# Patient Record
Sex: FEMALE | Race: WHITE | Employment: OTHER | URBAN - METROPOLITAN AREA
[De-identification: names, ages, dates, MRNs, and addresses within clinical notes are randomized per-mention and may not be internally consistent; named-entity substitution may affect disease eponyms.]

---

## 2017-01-17 ENCOUNTER — OFFICE VISIT (OUTPATIENT)
Dept: DERMATOLOGY | Age: 77
End: 2017-01-17

## 2017-01-17 DIAGNOSIS — L81.4 LENTIGO: ICD-10-CM

## 2017-01-17 DIAGNOSIS — L72.0 MILIAL CYST: Primary | ICD-10-CM

## 2017-01-17 PROCEDURE — 99212 OFFICE O/P EST SF 10 MIN: CPT | Performed by: DERMATOLOGY

## 2017-04-11 ENCOUNTER — OFFICE VISIT (OUTPATIENT)
Dept: DERMATOLOGY | Age: 77
End: 2017-04-11

## 2017-04-11 DIAGNOSIS — Z85.828 HISTORY OF NONMELANOMA SKIN CANCER: ICD-10-CM

## 2017-04-11 DIAGNOSIS — D48.9 NEOPLASM OF UNCERTAIN BEHAVIOR: Primary | ICD-10-CM

## 2017-04-11 DIAGNOSIS — D22.9 BENIGN NEVUS: ICD-10-CM

## 2017-04-11 DIAGNOSIS — L72.0 MILIAL CYST: ICD-10-CM

## 2017-04-11 DIAGNOSIS — L81.4 LENTIGO: ICD-10-CM

## 2017-04-11 PROCEDURE — 99214 OFFICE O/P EST MOD 30 MIN: CPT | Performed by: DERMATOLOGY

## 2017-04-11 PROCEDURE — 11100 PR BIOPSY OF SKIN LESION: CPT | Performed by: DERMATOLOGY

## 2017-04-17 ENCOUNTER — TELEPHONE (OUTPATIENT)
Dept: DERMATOLOGY | Age: 77
End: 2017-04-17

## 2017-10-17 ENCOUNTER — OFFICE VISIT (OUTPATIENT)
Dept: DERMATOLOGY | Age: 77
End: 2017-10-17

## 2017-10-17 DIAGNOSIS — D22.9 BENIGN NEVUS: ICD-10-CM

## 2017-10-17 DIAGNOSIS — Z85.828 HISTORY OF NONMELANOMA SKIN CANCER: ICD-10-CM

## 2017-10-17 DIAGNOSIS — L57.0 AK (ACTINIC KERATOSIS): Primary | ICD-10-CM

## 2017-10-17 DIAGNOSIS — L72.0 MILIAL CYST: ICD-10-CM

## 2017-10-17 DIAGNOSIS — D48.9 NEOPLASM OF UNCERTAIN BEHAVIOR: ICD-10-CM

## 2017-10-17 PROCEDURE — 99214 OFFICE O/P EST MOD 30 MIN: CPT | Performed by: DERMATOLOGY

## 2017-10-17 PROCEDURE — 17000 DESTRUCT PREMALG LESION: CPT | Performed by: DERMATOLOGY

## 2017-10-17 PROCEDURE — 11100 PR BIOPSY OF SKIN LESION: CPT | Performed by: DERMATOLOGY

## 2017-10-17 NOTE — PROGRESS NOTES
Social History Narrative    No narrative on file       Physical Examination       -General: Well-appearing, NAD  Normal affect. Total body skin exam including scalp, face, neck, chest, abdomen, back, bilateral upper extremities, bilateral lower extremities, ocular conjunctiva, external lips, and nails was performed. Underwear area not examined. Scattered on the trunk and extremities are multiple well-defined round and oval symmetric smoothly-bordered uniformly brown macules and papules. Gritty erythematous papule right nasal sidewall-actinic keratosis. Multiple white cystic papules on her face ranging in size from 1-3 mm face-milium  Left anterior scalp 4 mm pink papule rule out basal cell carcinoma-possible traumatized nevus          Assessment and Plan     1. AK (actinic keratosis) - 1, nose lesion(s) treated w/ liquid nitrogen. Edu re: risk of blister formation, discomfort, scar, hypopigmentation. Discussed wound care. 2. Neoplasm of uncertain behavior -Left anterior scalp--Discussed possible diagnosis. Patient agreeable to biopsy. Verbal consent obtained after risks (infection, bleeding, scar), benefits and alternatives explained. -Area(s) to be biopsied were marked with a surgical pen. Site(s) were cleansed with alcohol. Local anesthesia achieved with 1% lidocaine with epinephrine/sodium bicarbonate. Shave biopsy performed with a razor blade. Hemostasis was achieved with aluminum chloride. The wound(s) were dressed with petrolatum and covered with a bandage. Specimen(s) sent to pathology. Pt educated re: risk of bleeding, infection, scar and wound care instructions.      3. Benign nevus - Benign acquired melanocytic nevi  -Recommend monthly self skin exams   -Educated regarding the ABCDEs of melanoma detection   -Call for any new/changing moles or concerning lesions  -Reviewed sun protective behavior -- sun avoidance during the peak hours of the day, sun-protective clothing (including hat and

## 2017-10-23 ENCOUNTER — TELEPHONE (OUTPATIENT)
Dept: DERMATOLOGY | Age: 77
End: 2017-10-23

## 2018-03-16 ENCOUNTER — TELEPHONE (OUTPATIENT)
Dept: DERMATOLOGY | Age: 78
End: 2018-03-16

## 2018-03-19 ENCOUNTER — TELEPHONE (OUTPATIENT)
Dept: DERMATOLOGY | Age: 78
End: 2018-03-19

## 2018-03-19 DIAGNOSIS — L71.9 ROSACEA: ICD-10-CM

## 2018-03-19 RX ORDER — METRONIDAZOLE 7.5 MG/G
GEL TOPICAL
Qty: 60 G | Refills: 3 | Status: SHIPPED | OUTPATIENT
Start: 2018-03-19 | End: 2018-07-31 | Stop reason: SDUPTHER

## 2018-03-19 NOTE — TELEPHONE ENCOUNTER
Patient calling and would like metroNIDAZOLE (METROCREAM) 0.75 % cream,  She would like this in a gel.     Call back # [phone]

## 2018-03-22 ENCOUNTER — TELEPHONE (OUTPATIENT)
Dept: DERMATOLOGY | Age: 78
End: 2018-03-22

## 2018-04-17 ENCOUNTER — OFFICE VISIT (OUTPATIENT)
Dept: DERMATOLOGY | Age: 78
End: 2018-04-17

## 2018-04-17 DIAGNOSIS — Z85.828 HISTORY OF NONMELANOMA SKIN CANCER: ICD-10-CM

## 2018-04-17 DIAGNOSIS — L71.9 ROSACEA: Primary | ICD-10-CM

## 2018-04-17 DIAGNOSIS — D22.9 BENIGN NEVUS: ICD-10-CM

## 2018-04-17 DIAGNOSIS — L81.4 LENTIGO: ICD-10-CM

## 2018-04-17 DIAGNOSIS — L72.0 MILIAL CYST: ICD-10-CM

## 2018-04-17 PROCEDURE — G8400 PT W/DXA NO RESULTS DOC: HCPCS | Performed by: DERMATOLOGY

## 2018-04-17 PROCEDURE — 1123F ACP DISCUSS/DSCN MKR DOCD: CPT | Performed by: DERMATOLOGY

## 2018-04-17 PROCEDURE — G8421 BMI NOT CALCULATED: HCPCS | Performed by: DERMATOLOGY

## 2018-04-17 PROCEDURE — 1090F PRES/ABSN URINE INCON ASSESS: CPT | Performed by: DERMATOLOGY

## 2018-04-17 PROCEDURE — 4040F PNEUMOC VAC/ADMIN/RCVD: CPT | Performed by: DERMATOLOGY

## 2018-04-17 PROCEDURE — 1036F TOBACCO NON-USER: CPT | Performed by: DERMATOLOGY

## 2018-04-17 PROCEDURE — G8427 DOCREV CUR MEDS BY ELIG CLIN: HCPCS | Performed by: DERMATOLOGY

## 2018-04-17 PROCEDURE — 99214 OFFICE O/P EST MOD 30 MIN: CPT | Performed by: DERMATOLOGY

## 2018-07-09 ENCOUNTER — OFFICE VISIT (OUTPATIENT)
Dept: DERMATOLOGY | Age: 78
End: 2018-07-09

## 2018-07-09 DIAGNOSIS — Z79.01 ANTICOAGULANT LONG-TERM USE: ICD-10-CM

## 2018-07-09 DIAGNOSIS — L71.9 ROSACEA: Primary | ICD-10-CM

## 2018-07-09 PROCEDURE — 99213 OFFICE O/P EST LOW 20 MIN: CPT | Performed by: DERMATOLOGY

## 2018-07-09 PROCEDURE — 1123F ACP DISCUSS/DSCN MKR DOCD: CPT | Performed by: DERMATOLOGY

## 2018-07-09 PROCEDURE — 4040F PNEUMOC VAC/ADMIN/RCVD: CPT | Performed by: DERMATOLOGY

## 2018-07-09 PROCEDURE — 1036F TOBACCO NON-USER: CPT | Performed by: DERMATOLOGY

## 2018-07-09 PROCEDURE — G8421 BMI NOT CALCULATED: HCPCS | Performed by: DERMATOLOGY

## 2018-07-09 PROCEDURE — G8427 DOCREV CUR MEDS BY ELIG CLIN: HCPCS | Performed by: DERMATOLOGY

## 2018-07-09 PROCEDURE — 1090F PRES/ABSN URINE INCON ASSESS: CPT | Performed by: DERMATOLOGY

## 2018-07-09 PROCEDURE — G8400 PT W/DXA NO RESULTS DOC: HCPCS | Performed by: DERMATOLOGY

## 2018-07-09 RX ORDER — DOXYCYCLINE HYCLATE 100 MG
TABLET ORAL
Qty: 60 TABLET | Refills: 0 | Status: SHIPPED | OUTPATIENT
Start: 2018-07-09 | End: 2018-07-31 | Stop reason: SDUPTHER

## 2018-07-09 RX ORDER — MINOCYCLINE HYDROCHLORIDE 100 MG/1
TABLET ORAL
Qty: 60 TABLET | Refills: 1 | Status: CANCELLED | OUTPATIENT
Start: 2018-07-09

## 2018-07-09 NOTE — PROGRESS NOTES
[facility]) Dermatology  [facility] DO [address], [address]       Becka Cast  1940    69 y.o. female     Date of Visit: 2018    Chief Complaint:   Chief Complaint   Patient presents with    Dermatitis     face, started a few weeks ago with itching, started using a \"yeast\" natural supplement, she thought she had a skin yeast infection, pt said she did NOT use the Metrogel when this started and hasnt used it for a while as she was \"clear\"        I was asked to see this patient by Dr. Limon ref. provider found. History of Present Illness:  Becka Cast is a 69 y.o. female who presents with the chief complaint of the followin. Patient last saw Dr. Alli Mcdonnell on  for a flare of her rosacea. She was encouraged to apply the Metrogel 0.75% BID and change to Cetaphil soap. On exam at that visit it was noted that she had multiple active inflammatory papules to her nose and cheeks. Patient states about 2-3 weeks ago she noticed itching to her face diffusely, she self diagnosed as a yeast infection and took a natural \"yeast\" supplement. She has not been applying the MetroGel for at least 3 weeks as her face was \"clear. \" Denies history of oral antibiotic use for rosacea. Denies fevers or chills. Patient states PCP tested temperature which was normal.      Review of Systems:  Constitutional: Reports general sense of well-being   Skin: No new or changing moles, no history of keloids or hypertrophic scars, no interval of severe sunburns  Heme: +warfarin    Past Medical History, Family History, Surgical History, Medications and Allergies reviewed.     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

## 2018-07-11 LAB
GRAM STAIN RESULT: NORMAL
WOUND/ABSCESS: NORMAL

## 2018-07-12 ENCOUNTER — TELEPHONE (OUTPATIENT)
Dept: DERMATOLOGY | Age: 78
End: 2018-07-12

## 2018-07-12 NOTE — PROGRESS NOTES
Results negative from wound culture, please inform patient: She should continue current antibiotic treatment for rosacea flare, inform her PCP about checking INR more frequently and f/u with Dr. Laquita Johnson as scheduled.

## 2018-07-12 NOTE — TELEPHONE ENCOUNTER
----- Message from Bam Hoover DO sent at 7/12/2018 12:35 PM EDT -----  Results negative from wound culture, please inform patient: She should continue current antibiotic treatment for rosacea flare, inform her PCP about checking INR more frequently and f/u with Dr. Sydnee Mendoza as scheduled.

## 2018-07-12 NOTE — TELEPHONE ENCOUNTER
PCP already aware and executing  extra INR checking per the notes in CE , informed pt, she verbalized understanding

## 2018-07-24 ENCOUNTER — TELEPHONE (OUTPATIENT)
Dept: DERMATOLOGY | Age: 78
End: 2018-07-24

## 2018-07-31 ENCOUNTER — OFFICE VISIT (OUTPATIENT)
Dept: DERMATOLOGY | Age: 78
End: 2018-07-31

## 2018-07-31 DIAGNOSIS — Z79.01 ANTICOAGULANT LONG-TERM USE: ICD-10-CM

## 2018-07-31 DIAGNOSIS — L71.9 ROSACEA: Primary | ICD-10-CM

## 2018-07-31 DIAGNOSIS — L24.9 IRRITANT DERMATITIS: ICD-10-CM

## 2018-07-31 PROCEDURE — 1090F PRES/ABSN URINE INCON ASSESS: CPT | Performed by: DERMATOLOGY

## 2018-07-31 PROCEDURE — G8421 BMI NOT CALCULATED: HCPCS | Performed by: DERMATOLOGY

## 2018-07-31 PROCEDURE — G8400 PT W/DXA NO RESULTS DOC: HCPCS | Performed by: DERMATOLOGY

## 2018-07-31 PROCEDURE — 1036F TOBACCO NON-USER: CPT | Performed by: DERMATOLOGY

## 2018-07-31 PROCEDURE — 1101F PT FALLS ASSESS-DOCD LE1/YR: CPT | Performed by: DERMATOLOGY

## 2018-07-31 PROCEDURE — 99213 OFFICE O/P EST LOW 20 MIN: CPT | Performed by: DERMATOLOGY

## 2018-07-31 PROCEDURE — 4040F PNEUMOC VAC/ADMIN/RCVD: CPT | Performed by: DERMATOLOGY

## 2018-07-31 PROCEDURE — 1123F ACP DISCUSS/DSCN MKR DOCD: CPT | Performed by: DERMATOLOGY

## 2018-07-31 PROCEDURE — G8427 DOCREV CUR MEDS BY ELIG CLIN: HCPCS | Performed by: DERMATOLOGY

## 2018-07-31 RX ORDER — METRONIDAZOLE 7.5 MG/G
GEL TOPICAL
Qty: 180 G | Refills: 3 | Status: SHIPPED | OUTPATIENT
Start: 2018-07-31 | End: 2021-04-27 | Stop reason: SDUPTHER

## 2018-07-31 RX ORDER — DOXYCYCLINE HYCLATE 100 MG
TABLET ORAL
Qty: 60 TABLET | Refills: 1 | Status: SHIPPED | OUTPATIENT
Start: 2018-07-31 | End: 2018-09-11 | Stop reason: SDUPTHER

## 2018-07-31 RX ORDER — OMEPRAZOLE 20 MG/1
CAPSULE, DELAYED RELEASE ORAL
COMMUNITY
Start: 2018-07-23

## 2018-07-31 RX ORDER — TRIAMCINOLONE ACETONIDE 1 MG/G
CREAM TOPICAL
Qty: 80 G | Refills: 2 | Status: SHIPPED | OUTPATIENT
Start: 2018-07-31 | End: 2020-07-21 | Stop reason: ALTCHOICE

## 2018-07-31 NOTE — PROGRESS NOTES
[facility]) Dermatology  Kim Cavazos M.D.  [phone]       Vernon Herrera  1940    69 y.o. female     Date of Visit: 7/31/2018    Chief Complaint:   Chief Complaint   Patient presents with    Follow-up    Rosacea     pt states there is much improvement        I was asked to see this patient by Dr. Limon ref. provider found. History of Present Illness:  1. Patient presents today for follow-up of rosacea flare. Much improved. Using MetroGel 0.75% b.i.d. and on doxycycline 100 mg p.o. b.i.d..  Inflammatory papules. Still has scale/irritation and erythema. Mild nausea on doxycycline but otherwise tolerating this well. Has had Coumadin check and adjusted dosing as needed. Mild pruritus of her back. Has developed in the last 1-2 weeks. Notes xerosis. 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 Social History:   Social History     Social History    Marital status:      Spouse name: N/A    Number of children: N/A    Years of education: N/A     Occupational History    Not on file.      Social History Main Topics    Smoking status: Never Smoker    Smokeless tobacco: Never Used    Alcohol use No    Drug use: Unknown    Sexual activity: Not on file     Other

## 2018-09-11 ENCOUNTER — OFFICE VISIT (OUTPATIENT)
Dept: DERMATOLOGY | Age: 78
End: 2018-09-11

## 2018-09-11 DIAGNOSIS — L71.9 ROSACEA: ICD-10-CM

## 2018-09-11 PROCEDURE — 1101F PT FALLS ASSESS-DOCD LE1/YR: CPT | Performed by: DERMATOLOGY

## 2018-09-11 PROCEDURE — G8427 DOCREV CUR MEDS BY ELIG CLIN: HCPCS | Performed by: DERMATOLOGY

## 2018-09-11 PROCEDURE — 99213 OFFICE O/P EST LOW 20 MIN: CPT | Performed by: DERMATOLOGY

## 2018-09-11 PROCEDURE — G8421 BMI NOT CALCULATED: HCPCS | Performed by: DERMATOLOGY

## 2018-09-11 PROCEDURE — 1123F ACP DISCUSS/DSCN MKR DOCD: CPT | Performed by: DERMATOLOGY

## 2018-09-11 PROCEDURE — 4040F PNEUMOC VAC/ADMIN/RCVD: CPT | Performed by: DERMATOLOGY

## 2018-09-11 PROCEDURE — 1090F PRES/ABSN URINE INCON ASSESS: CPT | Performed by: DERMATOLOGY

## 2018-09-11 PROCEDURE — G8400 PT W/DXA NO RESULTS DOC: HCPCS | Performed by: DERMATOLOGY

## 2018-09-11 PROCEDURE — 1036F TOBACCO NON-USER: CPT | Performed by: DERMATOLOGY

## 2018-09-11 RX ORDER — DOXYCYCLINE HYCLATE 100 MG
TABLET ORAL
Qty: 180 TABLET | Refills: 0 | Status: SHIPPED | OUTPATIENT
Start: 2018-09-11 | End: 2018-10-30 | Stop reason: SDUPTHER

## 2018-09-11 RX ORDER — SIMVASTATIN 10 MG
TABLET ORAL
COMMUNITY
Start: 2018-08-02

## 2018-10-30 ENCOUNTER — OFFICE VISIT (OUTPATIENT)
Dept: DERMATOLOGY | Age: 78
End: 2018-10-30
Payer: MEDICARE

## 2018-10-30 DIAGNOSIS — D22.9 BENIGN NEVUS: ICD-10-CM

## 2018-10-30 DIAGNOSIS — L71.9 ROSACEA: Primary | ICD-10-CM

## 2018-10-30 DIAGNOSIS — Z85.828 HISTORY OF NONMELANOMA SKIN CANCER: ICD-10-CM

## 2018-10-30 DIAGNOSIS — D48.5 NEOPLASM OF UNCERTAIN BEHAVIOR OF SKIN: ICD-10-CM

## 2018-10-30 PROCEDURE — 1101F PT FALLS ASSESS-DOCD LE1/YR: CPT | Performed by: DERMATOLOGY

## 2018-10-30 PROCEDURE — G8484 FLU IMMUNIZE NO ADMIN: HCPCS | Performed by: DERMATOLOGY

## 2018-10-30 PROCEDURE — 11100 PR BIOPSY OF SKIN LESION: CPT | Performed by: DERMATOLOGY

## 2018-10-30 PROCEDURE — G8421 BMI NOT CALCULATED: HCPCS | Performed by: DERMATOLOGY

## 2018-10-30 PROCEDURE — 1123F ACP DISCUSS/DSCN MKR DOCD: CPT | Performed by: DERMATOLOGY

## 2018-10-30 PROCEDURE — 4040F PNEUMOC VAC/ADMIN/RCVD: CPT | Performed by: DERMATOLOGY

## 2018-10-30 PROCEDURE — 1036F TOBACCO NON-USER: CPT | Performed by: DERMATOLOGY

## 2018-10-30 PROCEDURE — 1090F PRES/ABSN URINE INCON ASSESS: CPT | Performed by: DERMATOLOGY

## 2018-10-30 PROCEDURE — G8400 PT W/DXA NO RESULTS DOC: HCPCS | Performed by: DERMATOLOGY

## 2018-10-30 PROCEDURE — 99213 OFFICE O/P EST LOW 20 MIN: CPT | Performed by: DERMATOLOGY

## 2018-10-30 PROCEDURE — G8427 DOCREV CUR MEDS BY ELIG CLIN: HCPCS | Performed by: DERMATOLOGY

## 2018-10-30 RX ORDER — DOXYCYCLINE HYCLATE 100 MG
TABLET ORAL
Qty: 180 TABLET | Refills: 1 | Status: SHIPPED | OUTPATIENT
Start: 2018-10-30 | End: 2020-07-21 | Stop reason: ALTCHOICE

## 2018-10-30 NOTE — PROGRESS NOTES
[facility]) Dermatology  Park Galindo M.D.  [phone]       Theodore Means  1940    69 y.o. female     Date of Visit: 10/30/2018    Chief Complaint:   Chief Complaint   Patient presents with    Skin Lesion        I was asked to see this patient by Dr. Ashly whiting. provider found. History of Present Illness:  1. Total body skin exam    Rosacea-on doxycycline 100 mg p.o. q. day, which she has tolerated without a flare. Still using MetroGel. Occasional nausea with doxycycline. Has not noticed any new or changing pigmented lesions. 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 Social History:   Social History     Social History    Marital status:      Spouse name: N/A    Number of children: N/A    Years of education: N/A     Occupational History    Not on file. Social History Main Topics    Smoking status: Never Smoker    Smokeless tobacco: Never Used    Alcohol use No    Drug use: Unknown    Sexual activity: Not on file     Other Topics Concern    Not on file     Social History Narrative    No narrative on file       Physical Examination       -General: Well-appearing, NAD  1. Normal affect.   Total body skin exam including

## 2018-11-06 LAB — DERMATOLOGY PATHOLOGY REPORT: ABNORMAL

## 2018-11-07 ENCOUNTER — TELEPHONE (OUTPATIENT)
Dept: DERMATOLOGY | Age: 78
End: 2018-11-07

## 2018-11-07 DIAGNOSIS — D03.39 MELANOMA IN SITU OF CHEEK (HCC): Primary | ICD-10-CM

## 2018-11-07 NOTE — TELEPHONE ENCOUNTER
Referral faxed to Dr. Rosmery Ontiveros office.  Office note and path results sent to Dr. Kike Rivero

## 2018-11-07 NOTE — TELEPHONE ENCOUNTER
----- Message from Prashant Ramirez MD sent at 11/7/2018  1:54 PM EST -----  Please refer to Mauricio Lloyd for definitive wide local excision-please also send a copy of pathology and office note to her primary care doctor. Pls schedule follow up with me 3 mo.

## 2019-01-22 ENCOUNTER — OFFICE VISIT (OUTPATIENT)
Dept: DERMATOLOGY | Age: 79
End: 2019-01-22
Payer: MEDICARE

## 2019-01-22 DIAGNOSIS — Z85.828 HISTORY OF NONMELANOMA SKIN CANCER: ICD-10-CM

## 2019-01-22 DIAGNOSIS — Z85.820 HISTORY OF MELANOMA: ICD-10-CM

## 2019-01-22 DIAGNOSIS — L71.9 ROSACEA: Primary | ICD-10-CM

## 2019-01-22 DIAGNOSIS — D22.9 BENIGN NEVUS: ICD-10-CM

## 2019-01-22 DIAGNOSIS — L57.8 DIFFUSE PHOTODAMAGE OF SKIN: ICD-10-CM

## 2019-01-22 PROCEDURE — 1090F PRES/ABSN URINE INCON ASSESS: CPT | Performed by: DERMATOLOGY

## 2019-01-22 PROCEDURE — 99214 OFFICE O/P EST MOD 30 MIN: CPT | Performed by: DERMATOLOGY

## 2019-01-22 PROCEDURE — 1101F PT FALLS ASSESS-DOCD LE1/YR: CPT | Performed by: DERMATOLOGY

## 2019-01-22 PROCEDURE — G8421 BMI NOT CALCULATED: HCPCS | Performed by: DERMATOLOGY

## 2019-01-22 PROCEDURE — 1036F TOBACCO NON-USER: CPT | Performed by: DERMATOLOGY

## 2019-01-22 PROCEDURE — G8484 FLU IMMUNIZE NO ADMIN: HCPCS | Performed by: DERMATOLOGY

## 2019-01-22 PROCEDURE — 4040F PNEUMOC VAC/ADMIN/RCVD: CPT | Performed by: DERMATOLOGY

## 2019-01-22 PROCEDURE — G8400 PT W/DXA NO RESULTS DOC: HCPCS | Performed by: DERMATOLOGY

## 2019-01-22 PROCEDURE — G8427 DOCREV CUR MEDS BY ELIG CLIN: HCPCS | Performed by: DERMATOLOGY

## 2019-01-22 PROCEDURE — 1123F ACP DISCUSS/DSCN MKR DOCD: CPT | Performed by: DERMATOLOGY

## 2019-05-28 ENCOUNTER — OFFICE VISIT (OUTPATIENT)
Dept: DERMATOLOGY | Age: 79
End: 2019-05-28
Payer: MEDICARE

## 2019-05-28 DIAGNOSIS — L71.9 ROSACEA: Primary | ICD-10-CM

## 2019-05-28 DIAGNOSIS — D22.9 BENIGN NEVUS: ICD-10-CM

## 2019-05-28 DIAGNOSIS — Z85.820 HISTORY OF MELANOMA: ICD-10-CM

## 2019-05-28 DIAGNOSIS — Z85.828 HISTORY OF NONMELANOMA SKIN CANCER: ICD-10-CM

## 2019-05-28 PROCEDURE — 1090F PRES/ABSN URINE INCON ASSESS: CPT | Performed by: DERMATOLOGY

## 2019-05-28 PROCEDURE — 1123F ACP DISCUSS/DSCN MKR DOCD: CPT | Performed by: DERMATOLOGY

## 2019-05-28 PROCEDURE — G8427 DOCREV CUR MEDS BY ELIG CLIN: HCPCS | Performed by: DERMATOLOGY

## 2019-05-28 PROCEDURE — 99213 OFFICE O/P EST LOW 20 MIN: CPT | Performed by: DERMATOLOGY

## 2019-05-28 PROCEDURE — G8400 PT W/DXA NO RESULTS DOC: HCPCS | Performed by: DERMATOLOGY

## 2019-05-28 PROCEDURE — 1036F TOBACCO NON-USER: CPT | Performed by: DERMATOLOGY

## 2019-05-28 PROCEDURE — G8421 BMI NOT CALCULATED: HCPCS | Performed by: DERMATOLOGY

## 2019-05-28 PROCEDURE — 4040F PNEUMOC VAC/ADMIN/RCVD: CPT | Performed by: DERMATOLOGY

## 2019-05-28 NOTE — PROGRESS NOTES
[address]  Dermatology  Mily Montes M.D.  [phone]       Ashley Orourke  1940    69 y.o. female     Date of Visit: 5/28/2019    Chief Complaint:   Chief Complaint   Patient presents with    Skin Lesion     TBSE        I was asked to see this patient by Dr. Limon ref. provider found. History of Present Illness:  1. Total body skin exam    Rosacea-no recent flares. Remains on metro gel 0.75% b.i.d. Has not had to take doxycycline since her last visit. Multiple nevi. Stable in size, shape, color. Has not noticed any new or changing pigmented lesions. Does monitor her skin for change. 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     Social History:   Social History     Socioeconomic History    Marital status:      Spouse name: Not on file    Number of children: Not on file    Years of education: Not on file    Highest education level: Not on file   Occupational History    Not on file   Social Needs    Financial resource strain: Not on file    Food insecurity:     Worry: Not on file Inability: Not on file    Transportation needs:     Medical: Not on file     Non-medical: Not on file   Tobacco Use    Smoking status: Never Smoker    Smokeless tobacco: Never Used   Substance and Sexual Activity    Alcohol use: No    Drug use: Not on file    Sexual activity: Not on file   Lifestyle    Physical activity:     Days per week: Not on file     Minutes per session: Not on file    Stress: Not on file   Relationships    Social connections:     Talks on phone: Not on file     Gets together: Not on file     Attends Orthodoxy service: Not on file     Active member of club or organization: Not on file     Attends meetings of clubs or organizations: Not on file     Relationship status: Not on file    Intimate partner violence:     Fear of current or ex partner: Not on file     Emotionally abused: Not on file     Physically abused: Not on file     Forced sexual activity: Not on file   Other Topics Concern    Not on file   Social History Narrative    Not on file       Physical Examination       -General: Well-appearing, NAD  1. Normal affect. Total body skin exam including scalp, face, neck, chest, abdomen, back, bilateral upper extremities, bilateral lower extremities, ocular conjunctiva, external lips, and nails was performed. Examination normal unless stated below. Underwear area not examined. Scattered on the trunk and extremities are multiple well-defined round and oval symmetric smoothly-bordered uniformly brown macules and papules. Background erythema of face with no active inflammatory papules. Well-healed scar at site of prior melanoma and nonmelanoma skin cancers no sign of recurrence      Assessment and Plan     1. Rosacea -MetroGel 0.75% b.i.d. Doxycycline 100 mg p.o. b.i.d. for 2 weeks during flares    2.  Benign nevus - Benign acquired melanocytic nevi  -Recommend monthly self skin exams   -Educated regarding the ABCDEs of melanoma detection   -Call for any new/changing moles or

## 2019-09-24 ENCOUNTER — OFFICE VISIT (OUTPATIENT)
Dept: DERMATOLOGY | Age: 79
End: 2019-09-24
Payer: MEDICARE

## 2019-09-24 DIAGNOSIS — Z85.828 HISTORY OF NONMELANOMA SKIN CANCER: ICD-10-CM

## 2019-09-24 DIAGNOSIS — D22.9 BENIGN NEVUS: ICD-10-CM

## 2019-09-24 DIAGNOSIS — D48.5 NEOPLASM OF UNCERTAIN BEHAVIOR OF SKIN: Primary | ICD-10-CM

## 2019-09-24 DIAGNOSIS — L71.9 ROSACEA: ICD-10-CM

## 2019-09-24 DIAGNOSIS — Z85.820 HISTORY OF MELANOMA: ICD-10-CM

## 2019-09-24 PROCEDURE — G8421 BMI NOT CALCULATED: HCPCS | Performed by: DERMATOLOGY

## 2019-09-24 PROCEDURE — G8427 DOCREV CUR MEDS BY ELIG CLIN: HCPCS | Performed by: DERMATOLOGY

## 2019-09-24 PROCEDURE — 4040F PNEUMOC VAC/ADMIN/RCVD: CPT | Performed by: DERMATOLOGY

## 2019-09-24 PROCEDURE — 1090F PRES/ABSN URINE INCON ASSESS: CPT | Performed by: DERMATOLOGY

## 2019-09-24 PROCEDURE — 11102 TANGNTL BX SKIN SINGLE LES: CPT | Performed by: DERMATOLOGY

## 2019-09-24 PROCEDURE — 1036F TOBACCO NON-USER: CPT | Performed by: DERMATOLOGY

## 2019-09-24 PROCEDURE — G8400 PT W/DXA NO RESULTS DOC: HCPCS | Performed by: DERMATOLOGY

## 2019-09-24 PROCEDURE — 99214 OFFICE O/P EST MOD 30 MIN: CPT | Performed by: DERMATOLOGY

## 2019-09-24 PROCEDURE — 1123F ACP DISCUSS/DSCN MKR DOCD: CPT | Performed by: DERMATOLOGY

## 2019-09-27 LAB — DERMATOLOGY PATHOLOGY REPORT: NORMAL

## 2019-12-17 ENCOUNTER — TELEPHONE (OUTPATIENT)
Dept: DERMATOLOGY | Age: 79
End: 2019-12-17

## 2020-07-21 ENCOUNTER — OFFICE VISIT (OUTPATIENT)
Dept: DERMATOLOGY | Age: 80
End: 2020-07-21
Payer: MEDICARE

## 2020-07-21 VITALS — TEMPERATURE: 98.4 F

## 2020-07-21 PROCEDURE — G8421 BMI NOT CALCULATED: HCPCS | Performed by: DERMATOLOGY

## 2020-07-21 PROCEDURE — G8427 DOCREV CUR MEDS BY ELIG CLIN: HCPCS | Performed by: DERMATOLOGY

## 2020-07-21 PROCEDURE — 1090F PRES/ABSN URINE INCON ASSESS: CPT | Performed by: DERMATOLOGY

## 2020-07-21 PROCEDURE — 1036F TOBACCO NON-USER: CPT | Performed by: DERMATOLOGY

## 2020-07-21 PROCEDURE — 99214 OFFICE O/P EST MOD 30 MIN: CPT | Performed by: DERMATOLOGY

## 2020-07-21 PROCEDURE — 4040F PNEUMOC VAC/ADMIN/RCVD: CPT | Performed by: DERMATOLOGY

## 2020-07-21 PROCEDURE — G8400 PT W/DXA NO RESULTS DOC: HCPCS | Performed by: DERMATOLOGY

## 2020-07-21 PROCEDURE — 1123F ACP DISCUSS/DSCN MKR DOCD: CPT | Performed by: DERMATOLOGY

## 2020-07-21 NOTE — PROGRESS NOTES
[facility]) Dermatology  Sharda Almazan M.D.  [phone]       Brittanie Edmond  1940    67 y.o. female     Date of Visit: 7/21/2020    Chief Complaint:   Chief Complaint   Patient presents with    Skin Exam     TBSE, Hx of melanoma, NMSC,AK's        I was asked to see this patient by Dr. Limon ref. provider found. History of Present Illness:  1. Total-body skin exam    Multiple nevi-stable in size, shape, color. Has not noticed any new or changing pigmented lesions    Widespread milium face-developed after a facelift. Stable. Not itching, bleeding. Rosacea-currently using MetroGel 0.75% twice daily. Does have a prescription for doxycycline for 100 mg p.o. twice daily for 2 weeks as needed for flares. Has not needed this since her last appointment. 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     Social History:   Social History     Socioeconomic History    Marital status:      Spouse name: Not on file    Number of children: Not on file    Years of education: Not on file    Highest education level: Not on file   Occupational History    Not on file   Social Needs    Financial resource strain: Not on file    Food insecurity     Worry: Not on file     Inability: Not on file    Transportation needs     Medical: Not on file     Non-medical: Not on file   Tobacco Use    Smoking status: Never Smoker    Smokeless tobacco: Never Used   Substance and Sexual Activity    Alcohol use: No    Drug use: Not on file    Sexual activity: Not on file   Lifestyle    Physical activity     Days per week: Not on file     Minutes per session: Not on file    Stress: Not on file   Relationships    Social connections     Talks on phone: Not on file     Gets together: Not on file     Attends [employer] service: Not on file     Active member of club or organization: Not on file     Attends meetings of clubs or organizations: Not on file     Relationship status: Not on file    Intimate partner violence     Fear of current or ex partner: Not on file     Emotionally abused: Not on file     Physically abused: Not on file     Forced sexual activity: Not on file   Other Topics Concern    Not on file   Social History Narrative    Not on file       Physical Examination       -General: Well-appearing, NAD  1. Normal affect. Total body skin exam including scalp, face, neck, chest, abdomen, back, bilateral upper extremities, bilateral lower extremities, ocular conjunctiva, external lips, and nails was performed. Examination normal unless stated below. Underwear area not examined. Scattered on the trunk and extremities are multiple well-defined round and oval symmetric smoothly-bordered uniformly brown macules and papules. Multiple pink and white cystic papules face  Background erythema with mild erythema of nose and paranasal cheeks.   No active inflammatory papules        Assessment and Plan     1. Benign nevus - Benign acquired melanocytic nevi  -Recommend monthly self skin exams   -Educated regarding the ABCDEs of melanoma detection   -Call for any new/changing moles or concerning lesions  -Reviewed sun protective behavior -- sun avoidance during the peak hours of the day, sun-protective clothing (including hat and sunglasses), sunscreen use (water resistant, broad spectrum, SPF at least 30, need for reapplication every 2 to 3 hours), avoidance of tanning beds      2. Rosacea-MetroGel 0.75% twice daily, doxycycline 100 mg p.o. twice daily for up to 2 weeks for severe flares-Coumadin checks of start doxycycline. 3. Milium-monitor for change, no treatment for now, differential diagnosis includes osteoma cutis   4. History of nonmelanoma skin cancer - No evidence of recurrence. Discussed risk of subsequent skin cancers and increased risk of melanoma. Reviewed importance of monitoring skin for change and sun protection with hats and sunscreen, as well as sun avoidance. 5. History of melanoma - No evidence of recurrence. Discussed risk of subsequent skin cancers and increased risk of melanoma. Reviewed importance of monitoring skin for change and sun protection with hats and sunscreen, as well as sun avoidance.

## 2020-11-23 ENCOUNTER — TELEPHONE (OUTPATIENT)
Dept: DERMATOLOGY | Age: 80
End: 2020-11-23

## 2020-11-23 NOTE — TELEPHONE ENCOUNTER
Patient called- she has a mole located on her back that she is very concerned about, has a history of melanoma. Pt would like to come in sometime this week if possible.      [phone]

## 2020-11-24 ENCOUNTER — OFFICE VISIT (OUTPATIENT)
Dept: DERMATOLOGY | Age: 80
End: 2020-11-24
Payer: MEDICARE

## 2020-11-24 VITALS — TEMPERATURE: 96 F

## 2020-11-24 PROCEDURE — 99213 OFFICE O/P EST LOW 20 MIN: CPT | Performed by: DERMATOLOGY

## 2020-11-24 PROCEDURE — 1090F PRES/ABSN URINE INCON ASSESS: CPT | Performed by: DERMATOLOGY

## 2020-11-24 PROCEDURE — 1036F TOBACCO NON-USER: CPT | Performed by: DERMATOLOGY

## 2020-11-24 PROCEDURE — G8484 FLU IMMUNIZE NO ADMIN: HCPCS | Performed by: DERMATOLOGY

## 2020-11-24 PROCEDURE — 11102 TANGNTL BX SKIN SINGLE LES: CPT | Performed by: DERMATOLOGY

## 2020-11-24 PROCEDURE — 1123F ACP DISCUSS/DSCN MKR DOCD: CPT | Performed by: DERMATOLOGY

## 2020-11-24 PROCEDURE — G8421 BMI NOT CALCULATED: HCPCS | Performed by: DERMATOLOGY

## 2020-11-24 PROCEDURE — 4040F PNEUMOC VAC/ADMIN/RCVD: CPT | Performed by: DERMATOLOGY

## 2020-11-24 PROCEDURE — G8400 PT W/DXA NO RESULTS DOC: HCPCS | Performed by: DERMATOLOGY

## 2020-11-24 PROCEDURE — G8427 DOCREV CUR MEDS BY ELIG CLIN: HCPCS | Performed by: DERMATOLOGY

## 2020-11-24 NOTE — PROGRESS NOTES
[facility]) Dermatology  Beverly Weeks M.D.  [phone]       Jai Lang  1940    79 y.o. female     Date of Visit: 11/24/2020    Chief Complaint:   Chief Complaint   Patient presents with    Skin Lesion     back        I was asked to see this patient by Dr. Limon ref. provider found. History of Present Illness:  1. Urgent visit     Patient squeezed lesion left upper paraspinal back 5 days ago-became inflamed. The next day she scratched it again-became crusted.  has been applying a topical antibiotic. Patient very concerned it could be skin cancer. 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     Social History:   Social History     Socioeconomic History    Marital status:      Spouse name: Not on file    Number of children: Not on file    Years of education: Not on file    Highest education level: Not on file   Occupational History    Not on file   Social Needs    Financial resource strain: Not on file    Food insecurity     Worry: Not on file     Inability: Not on file    Transportation needs     Medical: Not on file     Non-medical: Not on file   Tobacco Use    Smoking status: Never Smoker    Smokeless tobacco: Never Used   Substance and Sexual Activity    Alcohol use: No    Drug use: Not on file    Sexual activity: Not on file   Lifestyle    Physical activity     Days per week: Not on file     Minutes per session: Not on file    Stress: Not on file   Relationships    Social connections     Talks on phone: Not on file     Gets together: Not on file     Attends Jew service: Not on file     Active member of club or organization: Not on file     Attends meetings of clubs or organizations: Not on file     Relationship status: Not on file    Intimate partner violence     Fear of current or ex partner: Not on file     Emotionally abused: Not on file     Physically abused: Not on file     Forced sexual activity: Not on file   Other Topics Concern    Not on file   Social History Narrative    Not on file       Physical Examination       -General: Well-appearing, NAD  1.  6 mm crusted papule left upper paraspinal back-possible inflamed seborrheic keratosis  Multiple other hyperkeratotic stuck on papules on her back-not inflamed          Assessment and Plan     1. Neoplasm of uncertain behavior of skin -left upper paraspinal back-possible inflamed seborrheic keratosis/ cyst-Discussed possible diagnosis. Patient agreeable to biopsy. Verbal consent obtained after risks (infection, bleeding, scar), benefits and alternatives explained. -Area(s) to be biopsied were marked with a surgical pen. Site(s) were cleansed with alcohol. Local anesthesia achieved with 1% lidocaine with epinephrine/sodium bicarbonate. Shave biopsy performed with a razor blade. Hemostasis was achieved with aluminum chloride. The wound(s) were dressed with petrolatum and covered with a bandage. Specimen(s) sent to pathology. Pt educated re: risk of bleeding, infection, scar and wound care instructions. After lesion anesthetized with 1% lidocaine, superficial crust removed-clear punctum and cyst contents visible. Cyst extracted through punctum and sent to pathology with crust.  Suspect inflamed cyst manipulated by patient. 2. Seborrheic keratoses--noninflamed. Monitor for change.   Treatment if they become symptomatic

## 2020-12-01 LAB — DERMATOLOGY PATHOLOGY REPORT: NORMAL

## 2021-01-26 ENCOUNTER — OFFICE VISIT (OUTPATIENT)
Dept: DERMATOLOGY | Age: 81
End: 2021-01-26
Payer: MEDICARE

## 2021-01-26 VITALS — TEMPERATURE: 98.1 F

## 2021-01-26 DIAGNOSIS — Z85.828 HISTORY OF NONMELANOMA SKIN CANCER: ICD-10-CM

## 2021-01-26 DIAGNOSIS — L82.1 SEBORRHEIC KERATOSES: ICD-10-CM

## 2021-01-26 DIAGNOSIS — Z85.820 HISTORY OF MELANOMA: ICD-10-CM

## 2021-01-26 DIAGNOSIS — D22.9 BENIGN NEVUS: Primary | ICD-10-CM

## 2021-01-26 PROCEDURE — 99213 OFFICE O/P EST LOW 20 MIN: CPT | Performed by: DERMATOLOGY

## 2021-01-26 PROCEDURE — 1090F PRES/ABSN URINE INCON ASSESS: CPT | Performed by: DERMATOLOGY

## 2021-01-26 PROCEDURE — 1123F ACP DISCUSS/DSCN MKR DOCD: CPT | Performed by: DERMATOLOGY

## 2021-01-26 PROCEDURE — 4040F PNEUMOC VAC/ADMIN/RCVD: CPT | Performed by: DERMATOLOGY

## 2021-01-26 PROCEDURE — G8427 DOCREV CUR MEDS BY ELIG CLIN: HCPCS | Performed by: DERMATOLOGY

## 2021-01-26 PROCEDURE — G8400 PT W/DXA NO RESULTS DOC: HCPCS | Performed by: DERMATOLOGY

## 2021-01-26 PROCEDURE — 1036F TOBACCO NON-USER: CPT | Performed by: DERMATOLOGY

## 2021-01-26 PROCEDURE — G8484 FLU IMMUNIZE NO ADMIN: HCPCS | Performed by: DERMATOLOGY

## 2021-01-26 PROCEDURE — G8421 BMI NOT CALCULATED: HCPCS | Performed by: DERMATOLOGY

## 2021-01-26 NOTE — PROGRESS NOTES
[facility]) Dermatology  Alexx Jamison M.D.  [phone]       Nancy Call  1940    79 y.o. female     Date of Visit: 1/26/2021    Chief Complaint:   Chief Complaint   Patient presents with    Skin Lesion     TBSE        I was asked to see this patient by Dr. Limon ref. provider found. History of Present Illness:  1. Total-body skin exam    Multiple nevi-stable in size, shape, color. Has not noticed any new or changing pigmented lesions. Does monitor her skin for change    Increasing number of seborrheic keratoses-asymptomatic. Not itching, bleeding.     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     Social History:   Social History     Socioeconomic History    Marital status:      Spouse name: Not on file    Number of children: Not on file    Years of education: Not on file    Highest education level: Not on file   Occupational History    Not on file   Social Needs    Financial resource strain: Not on file    Food insecurity     Worry: Not on file     Inability: Not on file    Transportation needs     Medical: Not on file     Non-medical: Not on file   Tobacco Use    Smoking status: Never Smoker    Smokeless tobacco: Never Used   Substance and Sexual Activity    Alcohol use: No    Drug use: Not on file    Sexual activity: Not on file   Lifestyle    Physical activity     Days per week: Not on file     Minutes per session: Not on file    Stress: Not on file   Relationships    Social connections     Talks on phone: Not on file     Gets together: Not on file     Attends Catholic service: Not on file     Active member of club or organization: Not on file     Attends meetings of clubs or organizations: Not on file     Relationship status: Not on file    Intimate partner violence     Fear of current or ex partner: Not on file     Emotionally abused: Not on file     Physically abused: Not on file     Forced sexual activity: Not on file   Other Topics Concern    Not on file   Social History Narrative    Not on file       Physical Examination       -General: Well-appearing, NAD  1. Normal affect. Total body skin exam including scalp, face, neck, chest, abdomen, back, bilateral upper extremities, bilateral lower extremities, ocular conjunctiva, external lips, and nails was performed. Examination normal unless stated below. Underwear area not examined. Scattered on the trunk and extremities are multiple well-defined round and oval symmetric smoothly-bordered uniformly brown macules and papules. Multiple hyperkeratotic stuck on papules torso  Well-healed scars no sign of recurrence      Assessment and Plan     1.  Benign nevus - Benign acquired melanocytic nevi  -Recommend monthly self skin exams   -Educated regarding the ABCDEs of melanoma detection   -Call for any new/changing moles or concerning lesions  -Reviewed sun protective behavior -- sun avoidance during the peak hours of the day, sun-protective clothing (including hat and sunglasses), sunscreen use (water resistant, broad spectrum, SPF at least 30, need for reapplication every 2 to 3 hours), avoidance of tanning beds      2. Seborrheic keratoses-monitor for change. Treatment of symptomatic lesions   3. History of nonmelanoma skin cancer - No evidence of recurrence. Discussed risk of subsequent skin cancers and increased risk of melanoma. Reviewed importance of monitoring skin for change and sun protection with hats and sunscreen, as well as sun avoidance. 4. History of melanoma - No evidence of recurrence. Discussed risk of subsequent skin cancers and increased risk of melanoma. Reviewed importance of monitoring skin for change and sun protection with hats and sunscreen, as well as sun avoidance.

## 2021-04-27 ENCOUNTER — TELEPHONE (OUTPATIENT)
Dept: DERMATOLOGY | Age: 81
End: 2021-04-27

## 2021-04-27 RX ORDER — METRONIDAZOLE 7.5 MG/G
GEL TOPICAL
Qty: 180 G | Refills: 3 | Status: SHIPPED | OUTPATIENT
Start: 2021-04-27 | End: 2022-10-19 | Stop reason: SDUPTHER

## 2021-04-27 NOTE — TELEPHONE ENCOUNTER
Patient is requesting a refill of metroNIDAZOLE (METROGEL) 0.75 % gel sent to [address] Thank you!

## 2021-07-27 ENCOUNTER — OFFICE VISIT (OUTPATIENT)
Dept: DERMATOLOGY | Age: 81
End: 2021-07-27
Payer: MEDICARE

## 2021-07-27 VITALS — TEMPERATURE: 98.8 F

## 2021-07-27 DIAGNOSIS — D22.9 BENIGN NEVUS: ICD-10-CM

## 2021-07-27 DIAGNOSIS — L71.9 ROSACEA: ICD-10-CM

## 2021-07-27 DIAGNOSIS — D48.5 NEOPLASM OF UNCERTAIN BEHAVIOR OF SKIN: Primary | ICD-10-CM

## 2021-07-27 DIAGNOSIS — L72.0 MILIUM: ICD-10-CM

## 2021-07-27 DIAGNOSIS — Z85.828 HISTORY OF NONMELANOMA SKIN CANCER: ICD-10-CM

## 2021-07-27 DIAGNOSIS — Z85.820 HISTORY OF MELANOMA: ICD-10-CM

## 2021-07-27 PROCEDURE — 99213 OFFICE O/P EST LOW 20 MIN: CPT | Performed by: DERMATOLOGY

## 2021-07-27 PROCEDURE — G8427 DOCREV CUR MEDS BY ELIG CLIN: HCPCS | Performed by: DERMATOLOGY

## 2021-07-27 PROCEDURE — 4040F PNEUMOC VAC/ADMIN/RCVD: CPT | Performed by: DERMATOLOGY

## 2021-07-27 PROCEDURE — 1090F PRES/ABSN URINE INCON ASSESS: CPT | Performed by: DERMATOLOGY

## 2021-07-27 PROCEDURE — G8400 PT W/DXA NO RESULTS DOC: HCPCS | Performed by: DERMATOLOGY

## 2021-07-27 PROCEDURE — 11102 TANGNTL BX SKIN SINGLE LES: CPT | Performed by: DERMATOLOGY

## 2021-07-27 PROCEDURE — 1036F TOBACCO NON-USER: CPT | Performed by: DERMATOLOGY

## 2021-07-27 PROCEDURE — 1123F ACP DISCUSS/DSCN MKR DOCD: CPT | Performed by: DERMATOLOGY

## 2021-07-27 PROCEDURE — G8421 BMI NOT CALCULATED: HCPCS | Performed by: DERMATOLOGY

## 2021-07-27 NOTE — PROGRESS NOTES
[facility]) Dermatology  Cami Damon M.D.  [phone]       Jess Smith  1940    79 y.o. female     Date of Visit: 7/27/2021    Chief Complaint:   Chief Complaint   Patient presents with    Lesion(s)     6 mo TBSE, spot on face. Hx of melanoma, NMSC        I was asked to see this patient by Dr. Limon ref. provider found. History of Present Illness:  1. Total-body skin exam    Crusted papule left medial cheek-has been present on and off for at least months but recently persistently inflamed. May have become irritated when she is make-up. Does have multiple milium that become inflamed on her face. Rosacea-uses MetroGel 0.75% twice daily. This is helpful for inflammatory papules. Still develops an occasional inflammatory papule. Multiple nevi. Stable in size, shape, color. Has not noticed any new or changing pigmented lesions. Does monitor her skin for change. Wears hats and sunscreen.     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 Social History:   Social History     Socioeconomic History    Marital status:      Spouse name: Not on file    Number of children: Not on file    Years of education: Not on file    Highest education level: Not on file   Occupational History    Not on file   Tobacco Use    Smoking status: Never Smoker    Smokeless tobacco: Never Used   Vaping Use    Vaping Use: Never used   Substance and Sexual Activity    Alcohol use: No    Drug use: Not on file    Sexual activity: Not on file   Other Topics Concern    Not on file   Social History Narrative    Not on file     Social Determinants of Health     Financial Resource Strain:     Difficulty of Paying Living Expenses:    Food Insecurity:     Worried About Running Out of Food in the Last Year:     [address] in the Last Year:    Transportation Needs:     Lack of Transportation (Medical):  Lack of Transportation (Non-Medical):    Physical Activity:     Days of Exercise per Week:     Minutes of Exercise per Session:    Stress:     Feeling of Stress :    Social Connections:     Frequency of Communication with Friends and Family:     Frequency of Social Gatherings with Friends and Family:     Attends Sikhism Services:     Active Member of Clubs or Organizations:     Attends Club or Organization Meetings:     Marital Status:    Intimate Partner Violence:     Fear of Current or Ex-Partner:     Emotionally Abused:     Physically Abused:     Sexually Abused:        Physical Examination       -General: Well-appearing, NAD  1. Normal affect. Total body skin exam including scalp, face, neck, chest, abdomen, back, bilateral upper extremities, bilateral lower extremities, ocular conjunctiva, external lips, and nails was performed. Examination normal unless stated below. Underwear area not examined.   Scattered on the trunk and extremities are multiple well-defined round and oval symmetric smoothly-bordered uniformly brown macules and papules. Well-healed scars no sign of recurrence multiple cystic papules both cheeks  Left medial cheek 3 mm crusted pink papule-possible inflamed cyst, rule out nonmelanoma skin cancer  Less than 1+ active inflammatory papules          Assessment and Plan     1. Neoplasm of uncertain behavior of skin-left medial cheek--Discussed possible diagnosis. Patient agreeable to biopsy. Verbal consent obtained after risks (infection, bleeding, scar), benefits and alternatives explained. -Area(s) to be biopsied were marked with a surgical pen. Site(s) were cleansed with alcohol. Local anesthesia achieved with 1% lidocaine with epinephrine/sodium bicarbonate. Shave biopsy performed with a razor blade. Hemostasis was achieved with aluminum chloride. The wound(s) were dressed with petrolatum and covered with a bandage. Specimen(s) sent to pathology. Pt educated re: risk of bleeding, infection, scar and wound care instructions. 2. Milium-differential diagnosis includes osteoma cutis, no further treatment for now   3. Benign nevus - Benign acquired melanocytic nevi  -Recommend monthly self skin exams   -Educated regarding the ABCDEs of melanoma detection   -Call for any new/changing moles or concerning lesions  -Reviewed sun protective behavior -- sun avoidance during the peak hours of the day, sun-protective clothing (including hat and sunglasses), sunscreen use (water resistant, broad spectrum, SPF at least 30, need for reapplication every 2 to 3 hours), avoidance of tanning beds      4. Rosacea-MetroGel 0.75% twice daily   5. History of nonmelanoma skin cancer - No evidence of recurrence. Discussed risk of subsequent skin cancers and increased risk of melanoma. Reviewed importance of monitoring skin for change and sun protection with hats and sunscreen, as well as sun avoidance.      6. History of melanoma - No evidence of recurrence. Discussed risk of subsequent skin cancers and increased risk of melanoma. Reviewed importance of monitoring skin for change and sun protection with hats and sunscreen, as well as sun avoidance.

## 2021-08-02 LAB — DERMATOLOGY PATHOLOGY REPORT: ABNORMAL

## 2021-08-03 ENCOUNTER — TELEPHONE (OUTPATIENT)
Dept: DERMATOLOGY | Age: 81
End: 2021-08-03

## 2021-08-03 NOTE — TELEPHONE ENCOUNTER
Spoke with patient and informed her of biopsy result. 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 Perez Clayton for Mohs.

## 2021-08-03 NOTE — TELEPHONE ENCOUNTER
----- Message from Asad Kim MD sent at 8/3/2021 12:28 PM EDT -----  Mohs/ excision- pls schedule and refer to Mohs surgeon

## 2022-06-21 ENCOUNTER — OFFICE VISIT (OUTPATIENT)
Dept: DERMATOLOGY | Age: 82
End: 2022-06-21
Payer: MEDICARE

## 2022-06-21 DIAGNOSIS — L82.1 SEBORRHEIC KERATOSES: ICD-10-CM

## 2022-06-21 DIAGNOSIS — Z85.828 HISTORY OF NONMELANOMA SKIN CANCER: ICD-10-CM

## 2022-06-21 DIAGNOSIS — L57.0 AK (ACTINIC KERATOSIS): Primary | ICD-10-CM

## 2022-06-21 DIAGNOSIS — Z85.820 HISTORY OF MELANOMA: ICD-10-CM

## 2022-06-21 DIAGNOSIS — D22.9 BENIGN NEVUS: ICD-10-CM

## 2022-06-21 PROCEDURE — 1036F TOBACCO NON-USER: CPT | Performed by: DERMATOLOGY

## 2022-06-21 PROCEDURE — 99213 OFFICE O/P EST LOW 20 MIN: CPT | Performed by: DERMATOLOGY

## 2022-06-21 PROCEDURE — G8427 DOCREV CUR MEDS BY ELIG CLIN: HCPCS | Performed by: DERMATOLOGY

## 2022-06-21 PROCEDURE — 17000 DESTRUCT PREMALG LESION: CPT | Performed by: DERMATOLOGY

## 2022-06-21 PROCEDURE — 1123F ACP DISCUSS/DSCN MKR DOCD: CPT | Performed by: DERMATOLOGY

## 2022-06-21 PROCEDURE — 1090F PRES/ABSN URINE INCON ASSESS: CPT | Performed by: DERMATOLOGY

## 2022-06-21 PROCEDURE — G8400 PT W/DXA NO RESULTS DOC: HCPCS | Performed by: DERMATOLOGY

## 2022-06-21 PROCEDURE — G8421 BMI NOT CALCULATED: HCPCS | Performed by: DERMATOLOGY

## 2022-06-21 RX ORDER — VALACYCLOVIR HYDROCHLORIDE 1 G/1
TABLET, FILM COATED ORAL
COMMUNITY
Start: 2022-04-18

## 2022-06-21 RX ORDER — LORATADINE 10 MG/1
10 TABLET ORAL DAILY
COMMUNITY
Start: 2022-05-25

## 2022-06-21 RX ORDER — TRAMADOL HYDROCHLORIDE 50 MG/1
50 TABLET ORAL EVERY 8 HOURS PRN
COMMUNITY
Start: 2021-07-16

## 2022-06-21 NOTE — PROGRESS NOTES
[facility]) Dermatology  Emiliana Wang M.D.  [phone]       Cierra Christian  1940    [de-identified] y.o. female     Date of Visit: 6/21/2022    Chief Complaint:   Chief Complaint   Patient presents with    Skin Lesion        I was asked to see this patient by Dr. Limon ref. provider found. History of Present Illness:  1. Total-body skin exam    Multiple nevi-stable in size, shape, color. Has not noticed any new or changing pigmented lesions. Seborrheic keratoses torso slowly increasing in size and number but not itching, bleeding. Asymptomatic. 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  4/16 right mid lateral thigh dysplastic nevus, mild  5/16 right inframammary chest superficial basal cell carcinoma status post curettage  10/16 left mid cheek solar lentigo, MITF negative for increased single cell melanocytes  11/18 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     Social History:   Social History     Socioeconomic History    Marital status:  Spouse name: Not on file    Number of children: Not on file    Years of education: Not on file    Highest education level: Not on file   Occupational History    Not on file   Tobacco Use    Smoking status: Never Smoker    Smokeless tobacco: Never Used   Vaping Use    Vaping Use: Never used   Substance and Sexual Activity    Alcohol use: No    Drug use: Not on file    Sexual activity: Not on file   Other Topics Concern    Not on file   Social History Narrative    Not on file     Social Determinants of Health     Financial Resource Strain:     Difficulty of Paying Living Expenses: Not on file   Food Insecurity:     Worried About Running Out of Food in the Last Year: Not on file    [place] of Food in the Last Year: Not on file   Transportation Needs:     Lack of Transportation (Medical): Not on file    Lack of Transportation (Non-Medical): Not on file   Physical Activity:     Days of Exercise per Week: Not on file    Minutes of Exercise per Session: Not on file   Stress:     Feeling of Stress : Not on file   Social Connections:     Frequency of Communication with Friends and Family: Not on file    Frequency of Social Gatherings with Friends and Family: Not on file    Attends Quaker Services: Not on file    Active Member of [address] [place] [address] [employer] or Organizations: Not on file    Attends Club or Organization Meetings: Not on file    Marital Status: Not on file   Intimate Partner Violence:     Fear of Current or Ex-Partner: Not on file    Emotionally Abused: Not on file    Physically Abused: Not on file    Sexually Abused: Not on file   Housing Stability:     Unable to Pay for Housing in the Last Year: Not on file    Number of [place] in the Last Year: Not on file    Unstable Housing in the Last Year: Not on file       Physical Examination       -General: Well-appearing, NAD  1. Normal affect.   Total body skin exam including scalp, face, neck, chest, abdomen, back, bilateral upper extremities, bilateral lower extremities, ocular conjunctiva, external lips, and nails was performed. Examination normal unless stated below. Underwear area not examined. Scattered on the trunk and extremities are multiple well-defined round and oval symmetric smoothly-bordered uniformly brown macules and papules. Multiple hyperkeratotic stuck on papules and plaques torso. Gritty erythematous papule right medial supra brow. Well-healed scars no sign of recurrent      Assessment and Plan     1. AK (actinic keratosis) -1-right medial supra brow lesion(s) treated w/ liquid nitrogen. Edu re: risk of blister formation, discomfort, scar, hypopigmentation. Discussed wound care. 2. Benign nevus - Benign acquired melanocytic nevi  -Recommend monthly self skin exams   -Educated regarding the ABCDEs of melanoma detection   -Call for any new/changing moles or concerning lesions  -Reviewed sun protective behavior -- sun avoidance during the peak hours of the day, sun-protective clothing (including hat and sunglasses), sunscreen use (water resistant, broad spectrum, SPF at least 30, need for reapplication every 2 to 3 hours), avoidance of tanning beds      3. Seborrheic keratoses - Discussed underlying nature of seborrheic keratosis and low risk of malignancy. Treatment reserved for lesions that are itching, bleeding, growing or otherwise becoming bothersome. Discussed monitoring for change with reevaluation for changing lesions. 4. History of melanoma - No evidence of recurrence. Discussed risk of subsequent skin cancers and increased risk of melanoma. Reviewed importance of monitoring skin for change and sun protection with hats and sunscreen, as well as sun avoidance. 5. History of nonmelanoma skin cancer - No evidence of recurrence. Discussed risk of subsequent skin cancers and increased risk of melanoma.  Reviewed importance of monitoring skin for change and sun protection with hats and sunscreen, as well as sun avoidance.

## 2022-10-19 ENCOUNTER — TELEPHONE (OUTPATIENT)
Dept: DERMATOLOGY | Age: 82
End: 2022-10-19

## 2022-10-19 RX ORDER — METRONIDAZOLE 7.5 MG/G
GEL TOPICAL
Qty: 180 G | Refills: 3 | Status: SHIPPED | OUTPATIENT
Start: 2022-10-19

## 2022-10-19 NOTE — TELEPHONE ENCOUNTER
[phone]. Patient is wanting a Rx refill of Metronidazole. Requesting 1 tube refill.      Please advise, thank you!!

## 2022-10-24 ENCOUNTER — TELEPHONE (OUTPATIENT)
Dept: DERMATOLOGY | Age: 82
End: 2022-10-24

## 2022-10-24 NOTE — TELEPHONE ENCOUNTER
[phone]. Patient called back and said [phone] will be sending a coverage review questionnaire for  to fill out. It needs filled out for reimbursement for patient for the Metronidazole Rx.  Patient said the date to put on it is October 21st.     Please advise, thank you!!

## 2022-10-24 NOTE — TELEPHONE ENCOUNTER
Left message on voicemail to notify that Metro gel questions were submitted to [facility] on 10/20/22. The approval letter was faxed to this office 10/22/22.  Approved 1/1/22-12/31/22

## 2022-10-25 ENCOUNTER — TELEPHONE (OUTPATIENT)
Dept: DERMATOLOGY | Age: 82
End: 2022-10-25

## 2022-10-25 NOTE — TELEPHONE ENCOUNTER
Pt calling states medication Metro Gel cost was 101.00 she states that  needs to complete a Questionare so she can get the medication at a cheaper price pls retur

## 2023-01-04 ENCOUNTER — OFFICE VISIT (OUTPATIENT)
Dept: DERMATOLOGY | Age: 83
End: 2023-01-04
Payer: MEDICARE

## 2023-01-04 DIAGNOSIS — L71.9 ROSACEA: ICD-10-CM

## 2023-01-04 DIAGNOSIS — D22.9 BENIGN NEVUS: ICD-10-CM

## 2023-01-04 DIAGNOSIS — L82.1 SEBORRHEIC KERATOSES: ICD-10-CM

## 2023-01-04 DIAGNOSIS — Z85.820 HISTORY OF MELANOMA: ICD-10-CM

## 2023-01-04 DIAGNOSIS — Z85.828 HISTORY OF NONMELANOMA SKIN CANCER: ICD-10-CM

## 2023-01-04 DIAGNOSIS — L72.0 MILIUM: Primary | ICD-10-CM

## 2023-01-04 PROCEDURE — G8421 BMI NOT CALCULATED: HCPCS | Performed by: DERMATOLOGY

## 2023-01-04 PROCEDURE — G8427 DOCREV CUR MEDS BY ELIG CLIN: HCPCS | Performed by: DERMATOLOGY

## 2023-01-04 PROCEDURE — 17110 DESTRUCTION B9 LES UP TO 14: CPT | Performed by: DERMATOLOGY

## 2023-01-04 PROCEDURE — 99214 OFFICE O/P EST MOD 30 MIN: CPT | Performed by: DERMATOLOGY

## 2023-01-04 PROCEDURE — G8484 FLU IMMUNIZE NO ADMIN: HCPCS | Performed by: DERMATOLOGY

## 2023-01-04 PROCEDURE — 1090F PRES/ABSN URINE INCON ASSESS: CPT | Performed by: DERMATOLOGY

## 2023-01-04 PROCEDURE — 1123F ACP DISCUSS/DSCN MKR DOCD: CPT | Performed by: DERMATOLOGY

## 2023-01-04 PROCEDURE — G8400 PT W/DXA NO RESULTS DOC: HCPCS | Performed by: DERMATOLOGY

## 2023-01-04 PROCEDURE — 1036F TOBACCO NON-USER: CPT | Performed by: DERMATOLOGY

## 2023-01-04 NOTE — PROGRESS NOTES
[facility]) Dermatology  Adele Hall M.D.  [phone]       Delgado Rodriguez  1940    81 y.o. female     Date of Visit: 1/4/2023    Chief Complaint:   Chief Complaint   Patient presents with    Skin Lesion        I was asked to see this patient by Dr. Limon ref. provider found. History of Present Illness:  1. TBSE    Multiple nevi. Patient has not noticed any new or changing pigmented lesions. Stable in size, shape, color. Not itching, bleeding. Seborrheic keratoses. Increasing number of hyperkeratotic stuck on papules and plaques over the torso. No discrete lesion itching, bleeding, becoming symptomatic. Rosacea-patient has been using MetroGel 0.75% twice daily consistently. Does feel that she has had some improvement. Milium left medial cheek-recently became inflamed, crusted. Slow to resolve.       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  4/16 right mid lateral thigh dysplastic nevus, mild  5/16 right inframammary chest superficial basal cell carcinoma status post curettage  10/16 left mid cheek solar lentigo, MITF negative for increased single cell melanocytes  11/18 melanoma in situ left mid cheek status post wide local excision by Jed Christensen  8/21 superficial squamous cell carcinoma left medial cheek status post Roxy Bustillo     Rosacea-Metro gel 0.75%, doxycycline as needed- for 2 weeks flares only (knows to follow Coumadin on doxycycline-Coumadin within normal range when on doxycycline previously)     Milium, 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 Social History:   Social History     Socioeconomic History    Marital status:      Spouse name: Not on file    Number of children: Not on file    Years of education: Not on file    Highest education level: Not on file   Occupational History    Not on file   Tobacco Use    Smoking status: Never    Smokeless tobacco: Never   Vaping Use    Vaping Use: Never used   Substance and Sexual Activity    Alcohol use: No    Drug use: Not on file    Sexual activity: Not on file   Other Topics Concern    Not on file   Social History Narrative    Not on file     Social Determinants of Health     Financial Resource Strain: Not on file   Food Insecurity: Not on file   Transportation Needs: Not on file   Physical Activity: Not on file   Stress: Not on file   Social Connections: Not on file   Intimate Partner Violence: Not on file   Housing Stability: Not on file       Physical Examination       -General: Well-appearing, NAD  1. Normal affect. Total body skin exam including scalp, face, neck, chest, abdomen, back, bilateral upper extremities, bilateral lower extremities, ocular conjunctiva, external lips, and nails was performed. Examination normal unless stated below. Underwear area not examined. Scattered on the trunk and extremities are multiple well-defined round and oval symmetric smoothly-bordered uniformly brown macules and papules. Multiple hyperkeratotic stuck on papules and plaques torso. Background erythema nose and cheeks-no active inflammatory papules. Multiple milium both cheeks including visibly erythematous crusted papule left medial cheek. Well-healed scars no sign of recurrence      Assessment and Plan     1. Milium-after the risk, complications, alternatives were explained to the patient informed consent was obtained. Lesion was unroofed with an 11-gauge and extracted.   Wound care instructions were given to the patient. 2. Benign nevus - Benign acquired melanocytic nevi  -Recommend monthly self skin exams   -Educated regarding the ABCDEs of melanoma detection   -Call for any new/changing moles or concerning lesions  -Reviewed sun protective behavior -- sun avoidance during the peak hours of the day, sun-protective clothing (including hat and sunglasses), sunscreen use (water resistant, broad spectrum, SPF at least 30, need for reapplication every 2 to 3 hours), avoidance of tanning beds     3. Seborrheic keratoses - Discussed underlying nature of seborrheic keratosis and low risk of malignancy. Treatment reserved for lesions that are itching, bleeding, growing or otherwise becoming bothersome. Discussed monitoring for change with reevaluation for changing lesions. 4. Rosacea-continue MetroGel 0.75% twice daily. If no flares, consider reducing down to daily use   5. History of nonmelanoma skin cancer - No evidence of recurrence. Discussed risk of subsequent skin cancers and increased risk of melanoma. Reviewed importance of monitoring skin for change and sun protection with hats and sunscreen, as well as sun avoidance. 6. History of melanoma - No evidence of recurrence. Discussed risk of subsequent skin cancers and increased risk of melanoma. Reviewed importance of monitoring skin for change and sun protection with hats and sunscreen, as well as sun avoidance.

## 2023-06-05 DIAGNOSIS — L71.9 ROSACEA: ICD-10-CM

## 2023-06-05 NOTE — TELEPHONE ENCOUNTER
Pt takes Doxycycline 100 mg. She is asking for a refill.   Please send to [facility]    Phone [phone]  Fax [phone]

## 2023-06-06 RX ORDER — DAPAGLIFLOZIN 10 MG/1
TABLET, FILM COATED ORAL
COMMUNITY
Start: 2023-05-16

## 2023-06-06 RX ORDER — CYCLOSPORINE 0.5 MG/ML
EMULSION OPHTHALMIC
COMMUNITY
Start: 2023-05-30

## 2023-06-06 RX ORDER — ATORVASTATIN CALCIUM 20 MG/1
TABLET, FILM COATED ORAL
COMMUNITY
Start: 2023-05-15

## 2023-06-06 RX ORDER — APIXABAN 5 MG/1
TABLET, FILM COATED ORAL
COMMUNITY
Start: 2023-05-15

## 2023-06-06 RX ORDER — DOXYCYCLINE HYCLATE 100 MG
TABLET ORAL
Qty: 14 TABLET | Refills: 1 | Status: SHIPPED | OUTPATIENT
Start: 2023-06-06

## 2023-06-06 RX ORDER — PREDNISOLONE ACETATE 10 MG/ML
1 SUSPENSION/ DROPS OPHTHALMIC 4 TIMES DAILY
COMMUNITY

## 2023-06-06 NOTE — TELEPHONE ENCOUNTER
Patient Rosezetta Schlatter had a refill for doxy sent- I wrote a note but couldn't route it in mobile epic. Pls call her and remind her that doxy interacts with her Coumadin- she needs to have her Coumadin checked while on doxy. She has done this before, but I want to be sure she remembers.      Noe Cousin

## 2023-06-06 NOTE — PROGRESS NOTES
Pls remind patient to have Coumadin checked on doxy- they interact to change Coumadin level. Patient has done this previous, but pls remind.

## 2023-06-06 NOTE — TELEPHONE ENCOUNTER
Spoke to patient to inform refill request sent and to remind patient she needs her coumadin checked due to doxy interaction w/ coumadin per Dr. Amanda Andrade. Patient states she is no longer taking coumadin and is now taking eliquis. Patient's med list reviewed and updated. Patient scheduled for 7/19/23 at 10:00am for 6mo rosacea f/u.

## 2023-07-19 ENCOUNTER — OFFICE VISIT (OUTPATIENT)
Dept: DERMATOLOGY | Age: 83
End: 2023-07-19

## 2023-07-19 DIAGNOSIS — Z85.828 HISTORY OF NONMELANOMA SKIN CANCER: ICD-10-CM

## 2023-07-19 DIAGNOSIS — D48.5 NEOPLASM OF UNCERTAIN BEHAVIOR OF SKIN: Primary | ICD-10-CM

## 2023-07-19 DIAGNOSIS — L72.0 MILIUM: ICD-10-CM

## 2023-07-19 DIAGNOSIS — L71.9 ROSACEA: ICD-10-CM

## 2023-07-19 DIAGNOSIS — Z85.820 HISTORY OF MELANOMA: ICD-10-CM

## 2023-07-19 DIAGNOSIS — D23.9 DILATED PORE OF WINER: ICD-10-CM

## 2023-07-19 NOTE — PROGRESS NOTES
[facility]) Dermatology  Austin Julian M.D.  [phone]       Valeriano Thurman  1940    81 y.o. female     Date of Visit: 7/19/2023    Chief Complaint:   Chief Complaint   Patient presents with    Skin Lesion        I was asked to see this patient by Dr. Limon ref. provider found. History of Present Illness:  1. Follow-up    Requested refill for doxycycline 6/5/2023-rosacea was flaring and she had developed ocular rosacea. Was treated by ophthalmologist in the meantime with good improvement and never took her doxycycline. Still using MetroGel 0.75%. Still having difficulty with osteoma cutis/milium-periodically become inflamed. Cosmetically bothersome. Cyst upper back-dilated punctum.   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  4/16 right mid lateral thigh dysplastic nevus, mild  5/16 right inframammary chest superficial basal cell carcinoma status post curettage  10/16 left mid cheek solar lentigo, MITF negative for increased single cell melanocytes  11/18 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

## 2023-07-24 LAB — DERMATOLOGY PATHOLOGY REPORT: NORMAL

## 2023-07-25 ENCOUNTER — TELEPHONE (OUTPATIENT)
Dept: DERMATOLOGY | Age: 83
End: 2023-07-25

## 2023-07-25 NOTE — TELEPHONE ENCOUNTER
----- Message from Nuzhat Christy LPN sent at 2/89/7325  9:28 AM EDT -----      ----- Message -----  From: Verenice Cordero MD  Sent: 7/24/2023   3:17 PM EDT  To: Steffanie Cook LPN    RV LN2- pls schedule.  Wednesday is OK

## 2023-07-26 ENCOUNTER — OFFICE VISIT (OUTPATIENT)
Dept: DERMATOLOGY | Age: 83
End: 2023-07-26
Payer: MEDICARE

## 2023-07-26 DIAGNOSIS — L57.0 AK (ACTINIC KERATOSIS): Primary | ICD-10-CM

## 2023-07-26 DIAGNOSIS — L82.1 SEBORRHEIC KERATOSES: ICD-10-CM

## 2023-07-26 PROCEDURE — 1123F ACP DISCUSS/DSCN MKR DOCD: CPT | Performed by: DERMATOLOGY

## 2023-07-26 PROCEDURE — G8400 PT W/DXA NO RESULTS DOC: HCPCS | Performed by: DERMATOLOGY

## 2023-07-26 PROCEDURE — 99212 OFFICE O/P EST SF 10 MIN: CPT | Performed by: DERMATOLOGY

## 2023-07-26 PROCEDURE — 1090F PRES/ABSN URINE INCON ASSESS: CPT | Performed by: DERMATOLOGY

## 2023-07-26 PROCEDURE — 1036F TOBACCO NON-USER: CPT | Performed by: DERMATOLOGY

## 2023-07-26 PROCEDURE — G8427 DOCREV CUR MEDS BY ELIG CLIN: HCPCS | Performed by: DERMATOLOGY

## 2023-07-26 PROCEDURE — 17003 DESTRUCT PREMALG LES 2-14: CPT | Performed by: DERMATOLOGY

## 2023-07-26 PROCEDURE — G8421 BMI NOT CALCULATED: HCPCS | Performed by: DERMATOLOGY

## 2023-07-26 PROCEDURE — 17000 DESTRUCT PREMALG LESION: CPT | Performed by: DERMATOLOGY

## 2023-07-26 NOTE — PROGRESS NOTES
Years of education: Not on file    Highest education level: Not on file   Occupational History    Not on file   Tobacco Use    Smoking status: Never    Smokeless tobacco: Never   Vaping Use    Vaping Use: Never used   Substance and Sexual Activity    Alcohol use: No    Drug use: Not on file    Sexual activity: Not on file   Other Topics Concern    Not on file   Social History Narrative    Not on file     Social Determinants of Health     Financial Resource Strain: Not on file   Food Insecurity: Not on file   Transportation Needs: Not on file   Physical Activity: Not on file   Stress: Not on file   Social Connections: Not on file   Intimate Partner Violence: Not on file   Housing Stability: Not on file       Physical Examination       -General: Well-appearing, NAD  1. Hyperkeratotic stuck on tan papule right temple. Gritty erythematous papule x2 including biopsy-proven papule left forehead. Site confirmed with patient and prior photograph      Assessment and Plan     1. AK (actinic keratosis) -2-left forehead after the risks, complications, and alternatives were explained to the patient, including risk of blister formation, discomfort, scar, hypopigmentation, patient elected to proceed with cryotherapy. Lesion(s) were treated w/ liquid nitrogen using a Cryogun. 1 freeze thaw cycle was performed with a freeze time of 1-5 seconds. There were no immediate complications. Wound care instructions were discussed with the patient. 2. Seborrheic keratoses - Discussed underlying nature of seborrheic keratosis and low risk of malignancy. Treatment reserved for lesions that are itching, bleeding, growing or otherwise becoming bothersome. Discussed monitoring for change with reevaluation for changing lesions.

## 2023-10-16 ENCOUNTER — OFFICE VISIT (OUTPATIENT)
Dept: DERMATOLOGY | Age: 83
End: 2023-10-16
Payer: MEDICARE

## 2023-10-16 DIAGNOSIS — L71.9 ROSACEA: ICD-10-CM

## 2023-10-16 DIAGNOSIS — L82.1 SEBORRHEIC KERATOSES: ICD-10-CM

## 2023-10-16 DIAGNOSIS — D48.5 NEOPLASM OF UNCERTAIN BEHAVIOR OF SKIN: Primary | ICD-10-CM

## 2023-10-16 DIAGNOSIS — Z85.828 HISTORY OF NONMELANOMA SKIN CANCER: ICD-10-CM

## 2023-10-16 DIAGNOSIS — Z85.820 HISTORY OF MELANOMA: ICD-10-CM

## 2023-10-16 PROCEDURE — G8421 BMI NOT CALCULATED: HCPCS | Performed by: DERMATOLOGY

## 2023-10-16 PROCEDURE — G8400 PT W/DXA NO RESULTS DOC: HCPCS | Performed by: DERMATOLOGY

## 2023-10-16 PROCEDURE — 1123F ACP DISCUSS/DSCN MKR DOCD: CPT | Performed by: DERMATOLOGY

## 2023-10-16 PROCEDURE — G8427 DOCREV CUR MEDS BY ELIG CLIN: HCPCS | Performed by: DERMATOLOGY

## 2023-10-16 PROCEDURE — 11102 TANGNTL BX SKIN SINGLE LES: CPT | Performed by: DERMATOLOGY

## 2023-10-16 PROCEDURE — 99213 OFFICE O/P EST LOW 20 MIN: CPT | Performed by: DERMATOLOGY

## 2023-10-16 PROCEDURE — 1036F TOBACCO NON-USER: CPT | Performed by: DERMATOLOGY

## 2023-10-16 PROCEDURE — 1090F PRES/ABSN URINE INCON ASSESS: CPT | Performed by: DERMATOLOGY

## 2023-10-16 PROCEDURE — G8484 FLU IMMUNIZE NO ADMIN: HCPCS | Performed by: DERMATOLOGY

## 2023-10-16 NOTE — PROGRESS NOTES
[facility]) Dermatology  Deanna Hurtado M.D.  [phone]       Brian Wood  1940    81 y.o. female     Date of Visit: 10/16/2023    Chief Complaint:   Chief Complaint   Patient presents with    Skin Lesion        I was asked to see this patient by Dr. Limon ref. provider found. History of Present Illness:  1. TBSE    Multiple nevi. Patient has not noticed any new or changing pigmented lesions. Stable in size, shape, color. Not itching, bleeding. Crusted papule right medial cheek-developed in the last several weeks. Not itching, bleeding. Asymptomatic    Rosacea-develops flares along her alar rim especially when she has a runny nose-using her MetroGel consistently.   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  4/16 right mid lateral thigh dysplastic nevus, mild  5/16 right inframammary chest superficial basal cell carcinoma status post curettage  10/16 left mid cheek solar lentigo, MITF negative for increased single cell melanocytes  11/18 melanoma in situ left mid cheek status post wide local excision by Leopold Gimenez  8/21 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
Arm band on/IV intact

## 2023-10-19 LAB — DERMATOLOGY PATHOLOGY REPORT: NORMAL

## 2023-10-20 ENCOUNTER — TELEPHONE (OUTPATIENT)
Dept: DERMATOLOGY | Age: 83
End: 2023-10-20

## 2023-10-20 NOTE — TELEPHONE ENCOUNTER
Pt would like her biopsy results. Please call her on Monday with the results.     Phone is [phone]

## 2023-11-09 ENCOUNTER — OFFICE VISIT (OUTPATIENT)
Dept: DERMATOLOGY | Age: 83
End: 2023-11-09

## 2023-11-09 DIAGNOSIS — L71.9 ROSACEA: ICD-10-CM

## 2023-11-09 DIAGNOSIS — Z85.820 HISTORY OF MELANOMA: ICD-10-CM

## 2023-11-09 DIAGNOSIS — Z85.828 HISTORY OF NONMELANOMA SKIN CANCER: ICD-10-CM

## 2023-11-09 DIAGNOSIS — L57.0 AK (ACTINIC KERATOSIS): Primary | ICD-10-CM

## 2023-11-09 RX ORDER — DOXYCYCLINE HYCLATE 100 MG/1
CAPSULE ORAL
Qty: 60 CAPSULE | Refills: 0 | Status: SHIPPED | OUTPATIENT
Start: 2023-11-09

## 2023-11-09 NOTE — PROGRESS NOTES
[facility]) Dermatology  Shakeel Sierra M.D.  [phone]       Bernard Figueroa  1940    81 y.o. female     Date of Visit: 11/9/2023    Chief Complaint:   Chief Complaint   Patient presents with    Lesion(s)     RV LN2 right medial cheek        I was asked to see this patient by Dr. Limon ref. provider found. History of Present Illness:  1. Patient presents today for follow-up of biopsy-proven actinic keratosis right medial cheek. Biopsy done 10/16/2023. Patient healing without difficulty    Also notes flaring of rosacea-multiple active erythematous papules nose, paranasal cheeks. Progressive since her last visit. Has tried topical MetroGel with minimal improvement. Has previously been on doxycycline for flares-does have doxycycline at home but has not yet taken this.         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  4/16 right mid lateral thigh dysplastic nevus, mild  5/16 right inframammary chest superficial basal cell carcinoma status post curettage  10/16 left mid cheek solar lentigo, MITF negative for increased single cell melanocytes  11/18 melanoma in situ left mid cheek status post wide local excision by Javier Mar  8/21 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

## 2023-11-27 ENCOUNTER — TELEPHONE (OUTPATIENT)
Dept: DERMATOLOGY | Age: 83
End: 2023-11-27

## 2023-11-27 RX ORDER — METRONIDAZOLE 7.5 MG/G
GEL TOPICAL
Qty: 90 G | Refills: 3 | Status: SHIPPED | OUTPATIENT
Start: 2023-11-27

## 2023-11-27 NOTE — TELEPHONE ENCOUNTER
Pt calling wanting a new script for medication metronidazole 0.75% gel wants it to sent to [address]

## 2023-12-27 NOTE — TELEPHONE ENCOUNTER
[phone]. Patient called back and said [phone] will be sending a coverage review questionnaire for  to fill out. It needs filled out for reimbursement for patient for the Metronidazole Rx.  Patient said the date to put on it is October 21st.    Please advise, thank you!!
[Negative] : Psychiatric

## 2024-02-07 ENCOUNTER — TELEPHONE (OUTPATIENT)
Dept: DERMATOLOGY | Age: 84
End: 2024-02-07

## 2024-02-07 NOTE — TELEPHONE ENCOUNTER
Pt calling to see the shortest amount of time she can take Doxycycline. Please call her at [phone]

## 2024-02-13 RX ORDER — DOXYCYCLINE HYCLATE 100 MG
TABLET ORAL
Qty: 60 TABLET | Refills: 1 | Status: SHIPPED | OUTPATIENT
Start: 2024-02-13

## 2024-02-13 NOTE — TELEPHONE ENCOUNTER
Patient calling in for a refill on her doxycycline she only has 2 days left her condition seems to be getting worse wants to know if it could it be called to [facility] in [facility] instead of [facility]. Patients phone is  [phone] Mustapha also has RSV she will be at the doctor until about 3 o'clock

## 2024-04-15 ENCOUNTER — OFFICE VISIT (OUTPATIENT)
Dept: DERMATOLOGY | Age: 84
End: 2024-04-15
Payer: MEDICARE

## 2024-04-15 DIAGNOSIS — Z85.828 HISTORY OF NONMELANOMA SKIN CANCER: ICD-10-CM

## 2024-04-15 DIAGNOSIS — D22.9 BENIGN NEVUS: ICD-10-CM

## 2024-04-15 DIAGNOSIS — D48.5 NEOPLASM OF UNCERTAIN BEHAVIOR OF SKIN: Primary | ICD-10-CM

## 2024-04-15 DIAGNOSIS — L71.9 ROSACEA: ICD-10-CM

## 2024-04-15 DIAGNOSIS — Z85.820 HISTORY OF MELANOMA: ICD-10-CM

## 2024-04-15 PROCEDURE — 99214 OFFICE O/P EST MOD 30 MIN: CPT | Performed by: DERMATOLOGY

## 2024-04-15 PROCEDURE — 1036F TOBACCO NON-USER: CPT | Performed by: DERMATOLOGY

## 2024-04-15 PROCEDURE — 11102 TANGNTL BX SKIN SINGLE LES: CPT | Performed by: DERMATOLOGY

## 2024-04-15 PROCEDURE — 11103 TANGNTL BX SKIN EA SEP/ADDL: CPT | Performed by: DERMATOLOGY

## 2024-04-15 PROCEDURE — 1090F PRES/ABSN URINE INCON ASSESS: CPT | Performed by: DERMATOLOGY

## 2024-04-15 PROCEDURE — G8399 PT W/DXA RESULTS DOCUMENT: HCPCS | Performed by: DERMATOLOGY

## 2024-04-15 PROCEDURE — 1123F ACP DISCUSS/DSCN MKR DOCD: CPT | Performed by: DERMATOLOGY

## 2024-04-15 PROCEDURE — G8421 BMI NOT CALCULATED: HCPCS | Performed by: DERMATOLOGY

## 2024-04-15 PROCEDURE — G8427 DOCREV CUR MEDS BY ELIG CLIN: HCPCS | Performed by: DERMATOLOGY

## 2024-04-15 NOTE — PROGRESS NOTES
[facility] Dermatology  Laila Gayle M.D.  [phone]       Melissa Brown  1940    83 y.o. female     Date of Visit: 4/15/2024    Chief Complaint:   Chief Complaint   Patient presents with    Skin Lesion        I was asked to see this patient by Dr. Limon ref. provider found.    History of Present Illness:  1.  Total-body skin exam    Rosacea-flaring.  Was on prednisone and when she discontinued prednisone developed multiple active inflammatory papules nose, cheeks in February.  Had been using MetroGel 0.75% consistently, called for refill on her prescription for doxycycline, took this twice daily until she had improvement.  Discontinued and did well until more recently when she has developed background erythema, scale, inflammatory papules both cheeks and nose    Multiple nevi. Patient has not noticed any new or changing pigmented lesions.   Stable in size, shape, color. Not itching, bleeding.     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  4/16 right mid lateral thigh dysplastic nevus, mild  5/16 right inframammary chest superficial basal cell carcinoma status post curettage  10/16 left mid cheek solar lentigo, MITF negative for increased single cell melanocytes  11/18 melanoma in situ left mid cheek status post wide local excision by Freddie Bustillo  8/21 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

## 2024-04-17 ENCOUNTER — TELEPHONE (OUTPATIENT)
Dept: DERMATOLOGY | Age: 84
End: 2024-04-17

## 2024-04-17 NOTE — TELEPHONE ENCOUNTER
Pt has a spot on her eyes below the eye sacks it is red and worse on the left side. Please call her after 1:30 today [phone].

## 2024-04-18 LAB — DERMATOLOGY PATHOLOGY REPORT: NORMAL

## 2024-10-16 ENCOUNTER — OFFICE VISIT (OUTPATIENT)
Dept: DERMATOLOGY | Age: 84
End: 2024-10-16
Payer: MEDICARE

## 2024-10-16 DIAGNOSIS — Z85.828 HISTORY OF NONMELANOMA SKIN CANCER: ICD-10-CM

## 2024-10-16 DIAGNOSIS — D22.9 BENIGN NEVUS: ICD-10-CM

## 2024-10-16 DIAGNOSIS — L57.0 AK (ACTINIC KERATOSIS): Primary | ICD-10-CM

## 2024-10-16 DIAGNOSIS — Z85.820 HISTORY OF MELANOMA: ICD-10-CM

## 2024-10-16 DIAGNOSIS — L71.9 ROSACEA: ICD-10-CM

## 2024-10-16 PROCEDURE — 17000 DESTRUCT PREMALG LESION: CPT | Performed by: DERMATOLOGY

## 2024-10-16 PROCEDURE — 99214 OFFICE O/P EST MOD 30 MIN: CPT | Performed by: DERMATOLOGY

## 2024-10-16 PROCEDURE — 1036F TOBACCO NON-USER: CPT | Performed by: DERMATOLOGY

## 2024-10-16 PROCEDURE — G8421 BMI NOT CALCULATED: HCPCS | Performed by: DERMATOLOGY

## 2024-10-16 PROCEDURE — G8399 PT W/DXA RESULTS DOCUMENT: HCPCS | Performed by: DERMATOLOGY

## 2024-10-16 PROCEDURE — 1123F ACP DISCUSS/DSCN MKR DOCD: CPT | Performed by: DERMATOLOGY

## 2024-10-16 PROCEDURE — G8484 FLU IMMUNIZE NO ADMIN: HCPCS | Performed by: DERMATOLOGY

## 2024-10-16 PROCEDURE — G8427 DOCREV CUR MEDS BY ELIG CLIN: HCPCS | Performed by: DERMATOLOGY

## 2024-10-16 PROCEDURE — 1090F PRES/ABSN URINE INCON ASSESS: CPT | Performed by: DERMATOLOGY

## 2024-10-16 RX ORDER — TAFLUPROST OPTHALMIC 0 MG/.3ML
SOLUTION/ DROPS OPHTHALMIC
COMMUNITY
Start: 2024-02-13

## 2024-10-16 RX ORDER — DORZOLAMIDE HCL 20 MG/ML
SOLUTION/ DROPS OPHTHALMIC
COMMUNITY
Start: 2024-09-02

## 2024-10-16 RX ORDER — METRONIDAZOLE 7.5 MG/G
GEL TOPICAL
Qty: 90 G | Refills: 3 | Status: SHIPPED | OUTPATIENT
Start: 2024-10-16

## 2024-10-16 RX ORDER — TIMOLOL MALEATE 5 MG/ML
SOLUTION/ DROPS OPHTHALMIC
COMMUNITY
Start: 2024-09-11

## 2024-10-16 NOTE — PROGRESS NOTES
[facility] Dermatology  Laila Gayle M.D.  [phone]       Melissa Brown  1940    84 y.o. female     Date of Visit: 10/16/2024    Chief Complaint:   Chief Complaint   Patient presents with    Skin Lesion        I was asked to see this patient by Dr. Limon ref. provider found.    History of Present Illness:  1. TBSE    Multiple nevi. Patient has not noticed any new or changing pigmented lesions.   Stable in size, shape, color. Not itching, bleeding.     Rosacea-has had multiple flares since coming off of prednisone-feels that she has been more stable in the last 1 to 2 months.  Has not taken doxycycline recently.  Is using her MetroGel 0.75% consistently.    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  4/16 right mid lateral thigh dysplastic nevus, mild  5/16 right inframammary chest superficial basal cell carcinoma status post curettage  10/16 left mid cheek solar lentigo, MITF negative for increased single cell melanocytes  11/18 melanoma in situ left mid cheek status post wide local excision by Freddie Bustillo  8/21 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    Social History:   Social History

## 2024-11-05 RX ORDER — DOXYCYCLINE HYCLATE 100 MG
TABLET ORAL
Qty: 60 TABLET | Refills: 1 | Status: SHIPPED | OUTPATIENT
Start: 2024-11-05

## 2025-04-07 ENCOUNTER — OFFICE VISIT (OUTPATIENT)
Age: 85
End: 2025-04-07

## 2025-04-07 DIAGNOSIS — L24.9 IRRITANT DERMATITIS: ICD-10-CM

## 2025-04-07 DIAGNOSIS — D22.9 BENIGN NEVUS: ICD-10-CM

## 2025-04-07 DIAGNOSIS — L57.0 AK (ACTINIC KERATOSIS): Primary | ICD-10-CM

## 2025-04-07 DIAGNOSIS — L71.9 ROSACEA: ICD-10-CM

## 2025-04-07 RX ORDER — TRIAMCINOLONE ACETONIDE 1 MG/G
CREAM TOPICAL
Qty: 80 G | Refills: 0 | Status: SHIPPED | OUTPATIENT
Start: 2025-04-07

## 2025-04-07 NOTE — PROGRESS NOTES
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    Social History:   Social History     Socioeconomic History    Marital status:      Spouse name: Not on file    Number of children: Not on file    Years of education: Not on file    Highest education level: Not on file   Occupational History    Not on file   Tobacco Use    Smoking status: Never    Smokeless tobacco: Never   Vaping Use    Vaping status: Never Used   Substance and Sexual Activity    Alcohol use: No    Drug use: Not on file    Sexual activity: Not on file   Other Topics Concern    Not on file   Social History Narrative    Not on file     Social Drivers of Health     Financial Resource Strain: Low Risk  (8/31/2020)    [facility]    Overall Financial Resource Strain (CARDIA)     Difficulty of Paying Living Expenses: Not hard at all   Food Insecurity: No Transportation Needs (5/9/2023)    Received from  [facility],  [facility],  [facility],  [facility],  [facility]    Yearly Questionnaire     Do you need any assistance with obtaining housing, meals, medication, transportation or medical equipment?: No     Assistance needed for:: Not on file   Transportation Needs: No Transportation Needs (5/9/2023)    Received from  [facility],  [facility],  [facility],  [facility]    Yearly Questionnaire     Do you need any assistance with obtaining housing, meals, medication, transportation or medical equipment?: No     Assistance needed for:: Not on file   Physical Activity: Not on file   Stress:

## 2025-05-08 ENCOUNTER — TELEPHONE (OUTPATIENT)
Age: 85
End: 2025-05-08

## 2025-05-13 ENCOUNTER — OFFICE VISIT (OUTPATIENT)
Age: 85
End: 2025-05-13
Payer: MEDICARE

## 2025-05-13 DIAGNOSIS — D48.5 NEOPLASM OF UNCERTAIN BEHAVIOR OF SKIN: Primary | ICD-10-CM

## 2025-05-13 PROCEDURE — 11102 TANGNTL BX SKIN SINGLE LES: CPT | Performed by: DERMATOLOGY

## 2025-05-13 NOTE — PROGRESS NOTES
[facility] Dermatology  Laila Gayle M.D.  [phone]       Melissa Brown  1940    84 y.o. female     Date of Visit: 5/13/2025    Chief Complaint:   Chief Complaint   Patient presents with    Skin Lesion        I was asked to see this patient by Dr. Limon ref. provider found.    History of Present Illness:  1.  Urgent visit-lesion treated with liquid nitrogen left supra brow became raised, crusted but never fully resolved.         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  4/16 right mid lateral thigh dysplastic nevus, mild  5/16 right inframammary chest superficial basal cell carcinoma status post curettage  10/16 left mid cheek solar lentigo, MITF negative for increased single cell melanocytes  11/18 melanoma in situ left mid cheek status post wide local excision by Freddie Bustillo  8/21 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    Social History:   Social History     Socioeconomic History    Marital status:      Spouse name: Not on file    Number of children: Not on file    Years of education: Not on file    Highest

## 2025-05-16 LAB — DERMATOLOGY PATHOLOGY REPORT: ABNORMAL

## 2025-05-18 ENCOUNTER — RESULTS FOLLOW-UP (OUTPATIENT)
Age: 85
End: 2025-05-18

## 2025-05-18 DIAGNOSIS — C44.329 SQUAMOUS CELL CANCER OF SKIN OF EYEBROW: Primary | ICD-10-CM
